# Patient Record
Sex: FEMALE | Race: WHITE | NOT HISPANIC OR LATINO | Employment: FULL TIME | URBAN - NONMETROPOLITAN AREA
[De-identification: names, ages, dates, MRNs, and addresses within clinical notes are randomized per-mention and may not be internally consistent; named-entity substitution may affect disease eponyms.]

---

## 2024-04-08 ENCOUNTER — OFFICE VISIT (OUTPATIENT)
Dept: ENDOCRINOLOGY | Facility: CLINIC | Age: 29
End: 2024-04-08
Payer: COMMERCIAL

## 2024-04-08 VITALS
OXYGEN SATURATION: 100 % | HEIGHT: 64 IN | DIASTOLIC BLOOD PRESSURE: 66 MMHG | WEIGHT: 135.4 LBS | HEART RATE: 64 BPM | BODY MASS INDEX: 23.12 KG/M2 | SYSTOLIC BLOOD PRESSURE: 116 MMHG

## 2024-04-08 DIAGNOSIS — E03.9 ACQUIRED HYPOTHYROIDISM: Primary | ICD-10-CM

## 2024-04-08 DIAGNOSIS — N94.10 DYSPAREUNIA, FEMALE: ICD-10-CM

## 2024-04-08 LAB
T4 FREE SERPL-MCNC: 1.29 NG/DL (ref 0.93–1.7)
TSH SERPL DL<=0.05 MIU/L-ACNC: 3.07 UIU/ML (ref 0.27–4.2)

## 2024-04-08 PROCEDURE — 84146 ASSAY OF PROLACTIN: CPT | Performed by: NURSE PRACTITIONER

## 2024-04-08 PROCEDURE — 84144 ASSAY OF PROGESTERONE: CPT | Performed by: NURSE PRACTITIONER

## 2024-04-08 PROCEDURE — 82670 ASSAY OF TOTAL ESTRADIOL: CPT | Performed by: NURSE PRACTITIONER

## 2024-04-08 PROCEDURE — 84439 ASSAY OF FREE THYROXINE: CPT | Performed by: NURSE PRACTITIONER

## 2024-04-08 PROCEDURE — 86800 THYROGLOBULIN ANTIBODY: CPT | Performed by: NURSE PRACTITIONER

## 2024-04-08 PROCEDURE — 84481 FREE ASSAY (FT-3): CPT | Performed by: NURSE PRACTITIONER

## 2024-04-08 PROCEDURE — 84443 ASSAY THYROID STIM HORMONE: CPT | Performed by: NURSE PRACTITIONER

## 2024-04-08 PROCEDURE — 86376 MICROSOMAL ANTIBODY EACH: CPT | Performed by: NURSE PRACTITIONER

## 2024-04-08 PROCEDURE — 99203 OFFICE O/P NEW LOW 30 MIN: CPT | Performed by: NURSE PRACTITIONER

## 2024-04-08 PROCEDURE — 83002 ASSAY OF GONADOTROPIN (LH): CPT | Performed by: NURSE PRACTITIONER

## 2024-04-08 PROCEDURE — 83001 ASSAY OF GONADOTROPIN (FSH): CPT | Performed by: NURSE PRACTITIONER

## 2024-04-08 RX ORDER — LEVOTHYROXINE SODIUM 0.05 MG/1
TABLET ORAL
COMMUNITY
End: 2024-04-15 | Stop reason: SDUPTHER

## 2024-04-08 NOTE — PROGRESS NOTES
Chief Complaint   Patient presents with    Thyroid Problem     Fatigue, abnormal periods, cold intolerance, sexual dysfunction and no appetite.         Referring Provider  Kenzie Kruse, *     HPI   Kendra Rica Wright is a 28 y.o. female had concerns including Thyroid Problem (Fatigue, abnormal periods, cold intolerance, sexual dysfunction and no appetite. ).   Seen as a new patient.  Thyroid.    She was diagnosed in 06/2023 with hypothyroidism.  She was on T4 25 mcg in the beginning.  She was doing well and was noted to have abnormal labs in the beginning of 2024.  She was noted to have elevated levels minimally.  She was not feeling well at that time.  She was increased to 50 mcg QD.  She did not notice much of a difference in her [place].  She is having increased sexual dysfunction, including discomfort with intercourse.  She reports that her and her  are wanting to start trying for a baby soon.    Previous history of radiation to face/neck: no  Consuming foods high in iodine: no  Family history of thyroid complications: mother-Hashimoto's thyroiditis    The following portions of the patient's history were reviewed and updated as appropriate: allergies, current medications, past family history, past medical history, past social history, past surgical history, and problem list.    No past medical history on file.  No past surgical history on file.   No family history on file.   Social History     Socioeconomic History    Marital status:       Allergies   Allergen Reactions    Neomycin Sulfate [Neomycin] Swelling and Rash      Current Outpatient Medications on File Prior to Visit   Medication Sig Dispense Refill    levothyroxine (SYNTHROID, LEVOTHROID) 50 MCG tablet take 1 tablet by mouth every day in the morning on an empty stomach       No current facility-administered medications on file prior to visit.      Review of Systems   Constitutional:  Positive for fatigue. Negative for unexpected  "weight gain and unexpected weight loss.   Eyes: Negative.    Cardiovascular:  Positive for leg swelling.   Gastrointestinal:  Positive for constipation.   Endocrine: Positive for cold intolerance.   Genitourinary:  Positive for amenorrhea and decreased libido.   Skin:  Positive for dry skin.        Hair loss.   Psychiatric/Behavioral:  Positive for sleep disturbance.    All other systems reviewed and are negative.    /66 (BP Location: Right arm, Patient Position: Sitting, Cuff Size: Adult)   Pulse 64   Ht 162.6 cm (64\")   Wt 61.4 kg (135 lb 6.4 oz)   SpO2 100%   BMI 23.24 kg/m²      Physical Exam  Vitals reviewed.   Constitutional:       Appearance: Normal appearance.   Eyes:      Extraocular Movements: Extraocular movements intact.   Neck:      Thyroid: No thyroid mass, thyromegaly or thyroid tenderness.   Cardiovascular:      Rate and Rhythm: Normal rate.   Pulmonary:      Effort: Pulmonary effort is normal.   Skin:     General: Skin is warm.   Neurological:      General: No focal deficit present.      Mental Status: She is alert and oriented to person, place, and time.   Psychiatric:         Mood and Affect: Mood normal.         Behavior: Behavior normal.         Thought Content: Thought content normal.         Judgment: Judgment normal.       Labs/Imaging  CMP  No results found for: \"GLUCOSE\", \"BUN\", \"CREATININE\", \"EGFRIFNONA\", \"EGFRIFAFRI\", \"BCR\", \"K\", \"CO2\", \"CALCIUM\", \"PROTENTOTREF\", \"ALBUMIN\", \"LABIL2\", \"BILIRUBIN\", \"AST\", \"ALT\"     CBC w/DIFF No results found for: \"WBC\", \"RBC\", \"HGB\", \"HCT\", \"MCV\", \"MCH\", \"MCHC\", \"RDW\", \"RDWSD\", \"MPV\", \"PLT\", \"NEUTRORELPCT\", \"LYMPHORELPCT\", \"MONORELPCT\", \"EOSRELPCT\", \"BASORELPCT\", \"AUTOIGPER\", \"NEUTROABS\", \"LYMPHSABS\", \"MONOSABS\", \"EOSABS\", \"BASOSABS\", \"AUTOIGNUM\", \"NRBC\"    TSH  No results found for: \"TSH\"    T4  No results found for: \"FREET4\"  No results found for: \"B5OENWR\"    T3  No results found for: \"T3FREE\"  No results found for: \"H6CTKEK\"    TRAb  No " "results found for: \"TSURCPAB\"    TPO  No results found for: \"THYROIDAB\"    No valid procedures specified.    Assessment and Plan    Diagnoses and all orders for this visit:    1. Acquired hypothyroidism (Primary)  Assessment & Plan:  -Clinically euthyroid.  -TFT's today with medication adjustment accordingly.  -Reminded of proper administration including taking 7 pills, once daily, per week on an empty stomach with no missed doses, waiting 30-60 minutes prior to other medications or food.  -Follow-up in 6 months.      Orders:  -     TSH  -     T4, free  -     T3, Free  -     Thyroid Antibodies    2. Dyspareunia, female  Assessment & Plan:  Will obtain labs to determine if other underlying cause is present in need of treatment.      Orders:  -     Progesterone  -     Estradiol  -     FSH & LH  -     Prolactin         Return in about 6 months (around 10/8/2024) for Follow-up appointment. The patient was instructed to contact the clinic with any interval questions or concerns.      This document has been electronically signed by MEERA Powell  April 8, 2024 15:12 EDT   Endocrinology    Please note that portions of this document were completed with a voice recognition program. Efforts were made to edit the dictations, but occasionally words are mis-transcribed.   "

## 2024-04-08 NOTE — ASSESSMENT & PLAN NOTE
-Clinically euthyroid.  -TFT's today with medication adjustment accordingly.  -Reminded of proper administration including taking 7 pills, once daily, per week on an empty stomach with no missed doses, waiting 30-60 minutes prior to other medications or food.  -Follow-up in 6 months.

## 2024-04-09 LAB
ESTRADIOL SERPL HS-MCNC: 50.4 PG/ML
FSH SERPL-ACNC: 6.2 MIU/ML
LH SERPL-ACNC: 10.5 MIU/ML
PROGEST SERPL-MCNC: 0.1 NG/ML
PROLACTIN SERPL-MCNC: 12.3 NG/ML (ref 4.79–23.3)
T3FREE SERPL-MCNC: 2.4 PG/ML (ref 2–4.4)

## 2024-04-10 LAB
THYROGLOB AB SERPL-ACNC: <1 IU/ML (ref 0–0.9)
THYROPEROXIDASE AB SERPL-ACNC: 95 IU/ML (ref 0–34)

## 2024-04-15 RX ORDER — LEVOTHYROXINE SODIUM 0.07 MG/1
75 TABLET ORAL DAILY
Qty: 90 TABLET | Refills: 2 | Status: SHIPPED | OUTPATIENT
Start: 2024-04-15

## 2024-04-22 ENCOUNTER — TELEPHONE (OUTPATIENT)
Dept: ENDOCRINOLOGY | Facility: CLINIC | Age: 29
End: 2024-04-22
Payer: COMMERCIAL

## 2024-04-22 NOTE — TELEPHONE ENCOUNTER
PATIENT STATES THAT [facility] IS TELLING HER THAT THEY HAVE NOT RECEIVED THE RX FOR LEVOTHYROXINE SENT ON 4/15/2024. VERIFIED PHARMACY WITH PATIENT.

## 2024-04-23 RX ORDER — LEVOTHYROXINE SODIUM 0.07 MG/1
75 TABLET ORAL DAILY
Qty: 90 TABLET | Refills: 2 | Status: SHIPPED | OUTPATIENT
Start: 2024-04-23 | End: 2024-04-24 | Stop reason: SDUPTHER

## 2024-04-24 RX ORDER — LEVOTHYROXINE SODIUM 0.07 MG/1
75 TABLET ORAL DAILY
Qty: 90 TABLET | Refills: 2 | Status: SHIPPED | OUTPATIENT
Start: 2024-04-24

## 2024-04-24 NOTE — TELEPHONE ENCOUNTER
Pt called states prescription for Synthroid 75 mcg pharmacy never received. Receipt was confirmed by pharmacy on 04/23/24.

## 2025-03-05 ENCOUNTER — OFFICE VISIT (OUTPATIENT)
Dept: ENDOCRINOLOGY | Facility: CLINIC | Age: 30
End: 2025-03-05
Payer: COMMERCIAL

## 2025-03-05 VITALS
WEIGHT: 145.4 LBS | SYSTOLIC BLOOD PRESSURE: 110 MMHG | OXYGEN SATURATION: 99 % | BODY MASS INDEX: 24.96 KG/M2 | DIASTOLIC BLOOD PRESSURE: 50 MMHG | HEART RATE: 65 BPM

## 2025-03-05 DIAGNOSIS — E03.9 ACQUIRED HYPOTHYROIDISM: Primary | ICD-10-CM

## 2025-03-05 PROCEDURE — 84443 ASSAY THYROID STIM HORMONE: CPT | Performed by: NURSE PRACTITIONER

## 2025-03-05 PROCEDURE — 84439 ASSAY OF FREE THYROXINE: CPT | Performed by: NURSE PRACTITIONER

## 2025-03-05 RX ORDER — LEVOTHYROXINE SODIUM 100 UG/1
1 TABLET ORAL DAILY
COMMUNITY
Start: 2025-02-26

## 2025-03-05 NOTE — PROGRESS NOTES
Chief Complaint   Patient presents with    Follow-up     hypothyroidism        Referring Provider  No ref. provider found     HPI   Kendra Wright is a 29 y.o. female had concerns including Follow-up (hypothyroidism).   Hypothyroidism.    She has a baby 8 weeks ago and is doing well.  She denies any hyper/hypothyroidism symptoms.  She followed up with OB and they did not check her labs last time.   She is currently taking T4 100 mcg QD. She is taking this regularly and not missing doses.  She denies any conflicting medication.  She denies any changes to her neck or compressive symptoms.     Thyroid:  She was diagnosed in 06/2023 with hypothyroidism.  She was on T4 25 mcg in the beginning.  She was doing well and was noted to have abnormal labs in the beginning of 2024.  She was noted to have elevated levels minimally.  She was not feeling well at that time.  She was increased to 50 mcg QD.  She did not notice much of a difference in her [place].  She is having increased sexual dysfunction, including discomfort with intercourse.  She reports that her and her  are wanting to start trying for a baby soon.    Previous history of radiation to face/neck: no  Consuming foods high in iodine: no  Family history of thyroid complications: mother-Hashimoto's thyroiditis    The following portions of the patient's history were reviewed and updated as appropriate: allergies, current medications, past family history, past medical history, past social history, past surgical history, and problem list.    Past Medical History:   Diagnosis Date    Hypothyroidism 07/05/23     History reviewed. No pertinent surgical history.   Family History   Problem Relation Age of Onset    Anemia Mother     Thyroid disease Mother     Hypertension Maternal Grandmother     Obesity Maternal Grandmother     Thyroid disease Paternal Grandmother       Social History     Socioeconomic History    Marital status:    Tobacco Use    Smoking  status: Never     Passive exposure: Never    Smokeless tobacco: Never   Vaping Use    Vaping status: Never Used   Substance and Sexual Activity    Alcohol use: Never    Drug use: Never    Sexual activity: Yes     Partners: Male     Birth control/protection: Condom      Allergies   Allergen Reactions    Neomycin Sulfate [Neomycin] Swelling and Rash      Current Outpatient Medications on File Prior to Visit   Medication Sig Dispense Refill    levothyroxine (SYNTHROID, LEVOTHROID) 100 MCG tablet Take 1 tablet by mouth Daily.      levothyroxine (SYNTHROID, LEVOTHROID) 75 MCG tablet Take 1 tablet by mouth Daily. 90 tablet 2     No current facility-administered medications on file prior to visit.      Review of Systems   Constitutional:  Positive for fatigue. Negative for unexpected weight gain and unexpected weight loss.   Eyes: Negative.    Cardiovascular:  Positive for leg swelling.   Gastrointestinal:  Positive for constipation.   Endocrine: Positive for cold intolerance.   Genitourinary:  Positive for amenorrhea and decreased libido.   Skin:  Positive for dry skin.        Hair loss.   Psychiatric/Behavioral:  Positive for sleep disturbance.    All other systems reviewed and are negative.    /50 (BP Location: Right arm, Patient Position: Sitting, Cuff Size: Adult)   Pulse 65   Wt 66 kg (145 lb 6.4 oz)   SpO2 99%   BMI 24.96 kg/m²      Physical Exam  Vitals reviewed.   Constitutional:       Appearance: Normal appearance.   Eyes:      Extraocular Movements: Extraocular movements intact.   Neck:      Thyroid: No thyroid mass, thyromegaly or thyroid tenderness.   Cardiovascular:      Rate and Rhythm: Normal rate.   Pulmonary:      Effort: Pulmonary effort is normal.   Skin:     General: Skin is warm.   Neurological:      General: No focal deficit present.      Mental Status: She is alert and oriented to person, place, and time.   Psychiatric:         Mood and Affect: Mood normal.         Behavior: Behavior  "normal.         Thought Content: Thought content normal.         Judgment: Judgment normal.       Labs/Imaging  CMP  No results found for: \"GLUCOSE\", \"BUN\", \"CREATININE\", \"EGFRIFNONA\", \"EGFRIFAFRI\", \"BCR\", \"K\", \"CO2\", \"CALCIUM\", \"PROTENTOTREF\", \"ALBUMIN\", \"LABIL2\", \"BILIRUBIN\", \"AST\", \"ALT\"     CBC w/DIFF No results found for: \"WBC\", \"RBC\", \"HGB\", \"HCT\", \"MCV\", \"MCH\", \"MCHC\", \"RDW\", \"RDWSD\", \"MPV\", \"PLT\", \"NEUTRORELPCT\", \"LYMPHORELPCT\", \"MONORELPCT\", \"EOSRELPCT\", \"BASORELPCT\", \"AUTOIGPER\", \"NEUTROABS\", \"LYMPHSABS\", \"MONOSABS\", \"EOSABS\", \"BASOSABS\", \"AUTOIGNUM\", \"NRBC\"    TSH  Lab Results   Component Value Date    TSH 3.070 04/08/2024       T4  Lab Results   Component Value Date    FREET4 1.1 09/25/2024    FREET4 1.29 04/08/2024     No results found for: \"V9CDGDK\"    T3  Lab Results   Component Value Date    T3FREE 2.40 04/08/2024     No results found for: \"O5PHXWE\"    TRAb  No results found for: \"TSURCPAB\"    TPO  Lab Results   Component Value Date    THYROIDAB 95 (H) 04/08/2024       No valid procedures specified.    Assessment and Plan    Diagnoses and all orders for this visit:    1. Acquired hypothyroidism (Primary)  Assessment & Plan:  -Clinically euthyroid.  -TFT's today with medication adjustment accordingly.  -Reminded of proper administration including taking 7 pills, once daily, per week on an empty stomach with no missed doses, waiting 30-60 minutes prior to other medications or food.  -Follow-up in 6 months.    Orders:  -     TSH  -     T4, free           Return in about 6 months (around 9/5/2025) for Follow-up appointment. The patient was instructed to contact the clinic with any interval questions or concerns.      This document has been electronically signed by MEERA Powell  March 5, 2025 14:58 EST   Endocrinology    Please note that portions of this document were completed with a voice recognition program. Efforts were made to edit the dictations, but occasionally words are mis-transcribed. "

## 2025-03-06 LAB
T4 FREE SERPL-MCNC: 1.56 NG/DL (ref 0.92–1.68)
TSH SERPL DL<=0.05 MIU/L-ACNC: 1.26 UIU/ML (ref 0.27–4.2)

## 2025-05-27 RX ORDER — LEVOTHYROXINE SODIUM 75 UG/1
75 TABLET ORAL DAILY
Qty: 90 TABLET | Refills: 2 | Status: SHIPPED | OUTPATIENT
Start: 2025-05-27